# Patient Record
Sex: MALE | Race: WHITE | HISPANIC OR LATINO | Employment: FULL TIME | URBAN - METROPOLITAN AREA
[De-identification: names, ages, dates, MRNs, and addresses within clinical notes are randomized per-mention and may not be internally consistent; named-entity substitution may affect disease eponyms.]

---

## 2018-09-05 ENCOUNTER — OFFICE VISIT (OUTPATIENT)
Dept: UROLOGY | Facility: CLINIC | Age: 38
End: 2018-09-05
Payer: OTHER GOVERNMENT

## 2018-09-05 VITALS
TEMPERATURE: 99 F | HEIGHT: 68 IN | DIASTOLIC BLOOD PRESSURE: 81 MMHG | SYSTOLIC BLOOD PRESSURE: 138 MMHG | BODY MASS INDEX: 32.88 KG/M2 | WEIGHT: 216.94 LBS | HEART RATE: 78 BPM

## 2018-09-05 DIAGNOSIS — N52.9 ERECTILE DYSFUNCTION, UNSPECIFIED ERECTILE DYSFUNCTION TYPE: Primary | ICD-10-CM

## 2018-09-05 PROCEDURE — 99203 OFFICE O/P NEW LOW 30 MIN: CPT | Mod: PBBFAC,PN | Performed by: NURSE PRACTITIONER

## 2018-09-05 PROCEDURE — 99203 OFFICE O/P NEW LOW 30 MIN: CPT | Mod: S$PBB,,, | Performed by: NURSE PRACTITIONER

## 2018-09-05 PROCEDURE — 99999 PR PBB SHADOW E&M-NEW PATIENT-LVL III: CPT | Mod: PBBFAC,,, | Performed by: NURSE PRACTITIONER

## 2018-09-05 RX ORDER — FENOFIBRATE 160 MG/1
145 TABLET ORAL DAILY
COMMUNITY

## 2018-09-05 RX ORDER — TELMISARTAN AND HYDROCHLORTHIAZIDE 80; 25 MG/1; MG/1
1 TABLET ORAL DAILY
COMMUNITY

## 2018-09-05 RX ORDER — OMEPRAZOLE 40 MG/1
40 CAPSULE, DELAYED RELEASE ORAL DAILY
COMMUNITY

## 2018-09-05 NOTE — PATIENT INSTRUCTIONS
Evaluating Erectile Dysfunction     Doctor talking to man.     Many men feel embarrassed to talk to a doctor about erectile dysfunction (ED). This common problem can be treated, but only if your doctor knows about it. Your doctor will likely ask you questions about your ED. Whether youre asked or not, tell your doctor anything that might help your doctor understand the problem. Your doctor may do an exam and may run some tests to help find the cause of your ED.  A simple exam  A medical exam may help your doctor understand what is causing your problem. ED is sometimes the first sign of some other health problem, so your doctor may check your overall health. He or she may also examine your penis, scrotum, and testicles. Tell your doctor about all of the medicines you take, including prescribed and over-the-counter medicines, as well as any herbs or supplements.  You may have some tests  Your doctor may recommend some or all of these tests:  · Blood tests measure your levels of hormones or lipids (fatty substances in the blood, including cholesterol). Other tests check for diabetes or help show the health of your liver, kidneys, and prostate.  · Blood flow tests check how well blood moves through your penis.  · A rectal exam checks for an enlarged prostate gland. An enlarged prostate and ED have been linked in recent studies.  · Additional tests check for other conditions that limit your ability to have intercourse.  Your treatment plan  Based on what you say and what any exam shows, your doctor will recommend a treatment plan. The first step may be to try ED medicines, since they help most men. If they dont help you, your doctor can suggest other kinds of treatment. You and your partner may also want to discuss which options would work best in your relationship. Treatment may include addressing the cause of health problems, such as lowering your cholesterol. And counseling may be recommended to talk about  underlying emotional issues.  Date Last Reviewed: 1/1/2017 © 2000-2017 [employer]. [address] [place] [address], [place] [address]. All rights reserved. This information is not intended as a substitute for professional medical care. Always follow your healthcare professional's instructions.        Oral Medicines for Erectile Dysfunction  You can use prescription oral medicine for ED. They often work well. But, like all medicines, they can have side effects. Also, you cant use them if you have certain health problems or take certain other medicines. Talk with your doctor about oral ED medicine. Ask whether it is right for you.  Types of oral ED medicines  There are three types of prescription oral ED medicines. Each one increases blood flow to the penis. When the penis is stimulated, an erection results. The types are:  · Sildenafil citrate   · Tadalafil   · Vardenafil  · Avanfil  What oral ED medicines dont do  There are some things ED medicines cant do.  · They dont cure the cause of your ED. Without the medicine, youll still have trouble getting an erection.  · They cant produce an erection without sexual stimulation.  · They wont increase sexual desire. They also wont solve any other sexual issues. Psychological, emotional, or relationship issues will not be fixed.  Taking oral ED medicines safely  · Do not combine ED medicines with other treatments unless your doctor tells you to.  · Dont take ED medicines more often or in larger doses than prescribed.  · Tell your doctor your health history. Mention all medicines you take. This includes over-the-counter drugs, supplements, and herbs.  · Ask your doctor about whether you can drink alcohol while taking ED medication.  Possible side effects of oral ED medicines  · Headache  · Facial flushing  · Runny or stuffy nose  · Indigestion  · Distortion of your color vision for a short time  · Sudden vision loss or hearing loss (rare)  · Dizziness  Risks  of oral ED medicines  · Do not take ED medicines if you take medicines containing nitrates. The combination may drop your blood pressure to a dangerous level. Nitrates include nitroglycerin (a drug for angina or chest pain). They are also in poppers, an inhaled recreational drug. If youre not sure whether youre taking nitrates, ask your doctor or pharmacist.  · Medicines called alpha-blockers can interact with ED medicines. They can cause a sudden drop in blood pressure. Alpha-blockers are a common treatment for prostate problems. They also treat high blood pressure. Be sure your doctor knows if you take these medicines.  · If youve had a heart attack or have heart disease and you have not had sex for a while, talk to your doctor. Having sex again can put extra strain on your heart. Your doctor can confirm that your heart is healthy enough for sex.  · It is rare, but some men taking ED medicines have had sudden vision loss. This may be more likely if other health problems are present. These include high blood pressure and diabetes. Ask your doctor if you are at risk for this type of vision loss.  · In rare cases, an erection may last too long. This can badly damage the blood vessels in your penis. If an erection lasts longer than 4 hours, go to the emergency room right away.   Date Last Reviewed: 1/1/2017  © 4275-4852 [employer]. [address] [place] [address], [place] [address]. All rights reserved. This information is not intended as a substitute for professional medical care. Always follow your healthcare professional's instructions.        Understanding Erectile Dysfunction    Erectile dysfunction (ED) is a problem getting an erection firm enough or keeping it long enough for intercourse. The problem can happen to any man at any age. But health problems that can lead to ED become more common as a man ages. Up to half of men over age 40 experience ED at some point.  Causes of ED  ED can have many  causes. Most are physical. Some are emotional issues. Often, a combination of causes is involved. Causes of ED may include:  · Medical conditions such as diabetes or depression  · Smoking tobacco or marijuana  · Drinking too much alcohol  · Side effects of medications  · Injury to nerves or blood vessels  · Emotional issues such as stress or relationship problems  ED can be treated  Prescription medications for ED are available. They help many men who try them. Depending upon the cause of the ED, though, medications may not be enough. In these cases, other treatment options are available. These include erectile aids and surgery. Your health care provider can tell you more about the treatment that is right for you. And new treatments for ED are being studied. No matter what the treatment you decide on, stay in touch with your doctor. If your symptoms persist, he or she may be able to adjust your current treatment or try something new.  Date Last Reviewed: 1/1/2017  © 6071-6006 [employer]. [address] [place] [address], [place] [address]. All rights reserved. This information is not intended as a substitute for professional medical care. Always follow your healthcare professional's instructions.

## 2018-09-05 NOTE — PROGRESS NOTES
[facility] Note  Staff: TONA Pedroza    Referring provider and please cc:   PCP: [facility] [employer]    Chief Complaint: Erectile dysfunction issues    Subjective:        HPI: Tay Petty is a 38 y.o. male NEW PATIENT presents with having problems obtaining adequate erections for 1 year, and he is requesting evaluation related to this.  He states today that on or around November of 2017 his symptoms worsened.    Pt states he is able to achieve orgasm but takes a long time during sexual activity, but states problems with consistent decreasing rigidity, early detumescence, and he also has been experiencing decrease in his libido which also has been gradually worsening over the past few years. He has NOT tried any enhancement products.  Otherwise, he has received no other treatment nor any other evaluation. He, otherwise, denies any other  history. No hematuria. No history of urinary calculi    Gross Hematuria:  None  STDs in past: None  Vasectomy: None    , and admitted to dating someone recently, but now he is not dating at this time.    REVIEW OF SYSTEMS:  Review of Systems   Constitutional: Negative for chills, diaphoresis, fever and weight loss.   HENT: Negative for congestion, hearing loss, nosebleeds and sore throat.    Eyes: Negative for blurred vision and pain.   Respiratory: Negative for cough and wheezing.    Cardiovascular: Negative for chest pain, palpitations and leg swelling.        HTN   Gastrointestinal: Negative for abdominal pain, heartburn, nausea and vomiting.        GERD   Genitourinary: Negative for dysuria, flank pain, frequency, hematuria and urgency.        Erectile dysfunction issues.   Musculoskeletal: Negative for back pain, joint pain, myalgias and neck pain.   Skin: Negative for itching and rash.   Neurological: Negative for dizziness, tremors, sensory change, seizures, loss of consciousness, weakness and headaches.    Endo/Heme/Allergies: Does not bruise/bleed easily.   Psychiatric/Behavioral: Negative for depression and suicidal ideas. The patient is not nervous/anxious.      Physical Exam    PMHx:  Past Medical History:   Diagnosis Date    GERD (gastroesophageal reflux disease)     High cholesterol     Hypertension      Kidney stones: No  Cataracts? None    PSHx:  Past Surgical History:   Procedure Laterality Date    dental implant       Stents/Valves/Foreign Bodies: No  Cardiac Evaluation: No    Screening Studies  Colonoscopy: None     Fam Hx:   malignancies: No    kidney stones: No     Soc Hx:  No tobacco use    Allergies:  Patient has no known allergies.    Medications: reviewed   Anticoagulation: No    Objective:     Vitals:    09/05/18 1423   BP: 138/81   Pulse: 78   Temp: 98.6 °F (37 °C)     General:WDWN in NAD  Eyes: PERRLA, normal conjunctiva  Respiratory: no increased work on breathing, clear to auscultation  Cardiovascular: regular rate and rhythm. No obvious extremity edema.  GI: palpation of masses. No tenderness. No hepatosplenomegaly to palpation.  Musculoskeletal: normal range of motion of bilateral upper extremities. Normal muscle strength and tone.  Skin: no obvious rashes or lesions. No tightening of skin noted.  Neurologic: CN grossly normal. Normal sensation.   Psychiatric: awake, alert and oriented x 3. Mood and affect normal. Cooperative.    : Pt Deferred.    LABS REVIEW:  UA today:  Pt deferred  Assessment:       1. Erectile dysfunction, unspecified erectile dysfunction type          Plan:   ED:    Labs to be done: (*I reviewed with pt today that he had to be FASTING and have labs done prior to 9:30 am anyday in order to obtain accurate results)  CBC, BMP, FSH, LH, Prolactin  PSA, total and free  Free T4  TSH   Testosterone panel    F/u after we review lab results we will contact pt for further evaluation/treatment options.    [facility]: Inactive    Concepcion Guerrero, TONA

## 2018-09-17 ENCOUNTER — LAB VISIT (OUTPATIENT)
Dept: LAB | Facility: HOSPITAL | Age: 38
End: 2018-09-17
Attending: NURSE PRACTITIONER
Payer: OTHER GOVERNMENT

## 2018-09-17 DIAGNOSIS — N52.9 ERECTILE DYSFUNCTION, UNSPECIFIED ERECTILE DYSFUNCTION TYPE: ICD-10-CM

## 2018-09-17 LAB
ANION GAP SERPL CALC-SCNC: 11 MMOL/L
BUN SERPL-MCNC: 13 MG/DL
CALCIUM SERPL-MCNC: 9.8 MG/DL
CHLORIDE SERPL-SCNC: 104 MMOL/L
CO2 SERPL-SCNC: 22 MMOL/L
CREAT SERPL-MCNC: 0.9 MG/DL
ERYTHROCYTE [DISTWIDTH] IN BLOOD BY AUTOMATED COUNT: 12.8 %
EST. GFR  (AFRICAN AMERICAN): >60 ML/MIN/1.73 M^2
EST. GFR  (NON AFRICAN AMERICAN): >60 ML/MIN/1.73 M^2
FSH SERPL-ACNC: 18.7 MIU/ML
GLUCOSE SERPL-MCNC: 107 MG/DL
HCT VFR BLD AUTO: 42.9 %
HGB BLD-MCNC: 14.9 G/DL
LH SERPL-ACNC: 6 MIU/ML
MCH RBC QN AUTO: 31.1 PG
MCHC RBC AUTO-ENTMCNC: 34.8 G/DL
MCV RBC AUTO: 90 FL
PLATELET # BLD AUTO: 214 K/UL
PMV BLD AUTO: 8.5 FL
POTASSIUM SERPL-SCNC: 4.1 MMOL/L
PROLACTIN SERPL IA-MCNC: 12 NG/ML
PROSTATE SPECIFIC ANTIGEN, TOTAL: 0.51 NG/ML
PSA FREE MFR SERPL: 54.9 %
PSA FREE SERPL-MCNC: 0.28 NG/ML
RBC # BLD AUTO: 4.8 M/UL
SODIUM SERPL-SCNC: 137 MMOL/L
T4 FREE SERPL-MCNC: 0.77 NG/DL
TSH SERPL DL<=0.005 MIU/L-ACNC: 1.68 UIU/ML
WBC # BLD AUTO: 4.6 K/UL

## 2018-09-17 PROCEDURE — 84154 ASSAY OF PSA FREE: CPT

## 2018-09-17 PROCEDURE — 80048 BASIC METABOLIC PNL TOTAL CA: CPT

## 2018-09-17 PROCEDURE — 36415 COLL VENOUS BLD VENIPUNCTURE: CPT

## 2018-09-17 PROCEDURE — 85027 COMPLETE CBC AUTOMATED: CPT

## 2018-09-17 PROCEDURE — 84443 ASSAY THYROID STIM HORMONE: CPT

## 2018-09-17 PROCEDURE — 84270 ASSAY OF SEX HORMONE GLOBUL: CPT

## 2018-09-17 PROCEDURE — 84146 ASSAY OF PROLACTIN: CPT

## 2018-09-17 PROCEDURE — 83002 ASSAY OF GONADOTROPIN (LH): CPT

## 2018-09-17 PROCEDURE — 83001 ASSAY OF GONADOTROPIN (FSH): CPT

## 2018-09-17 PROCEDURE — 84439 ASSAY OF FREE THYROXINE: CPT

## 2018-09-20 LAB
ALBUMIN SERPL-MCNC: 4.7 G/DL (ref 3.6–5.1)
SHBG SERPL-SCNC: 35 NMOL/L (ref 10–50)
TESTOST FREE SERPL-MCNC: 74.9 PG/ML (ref 46–224)
TESTOST SERPL-MCNC: 577 NG/DL (ref 250–1100)
TESTOSTERONE.FREE+WB SERPL-MCNC: 160.5 NG/DL (ref 110–575)

## 2018-09-24 ENCOUNTER — TELEPHONE (OUTPATIENT)
Dept: UROLOGY | Facility: CLINIC | Age: 38
End: 2018-09-24

## 2018-09-24 NOTE — TELEPHONE ENCOUNTER
Spoke with patient, MD name given, endocrinlogy, patient will call to make appt, referral put in, patient verbally understood

## 2018-09-24 NOTE — TELEPHONE ENCOUNTER
----- Message from Komal Frey sent at 9/24/2018  9:56 AM CDT -----  Contact: Patient  Type:  Patient Returning Call    Who Called:  Patient  Who Left Message for Patient:    Does the patient know what this is regarding?:  Test results  Best Call Back Number:    Additional Information:

## 2018-09-24 NOTE — TELEPHONE ENCOUNTER
Returned call and spoke with patient, results given with recommendation, ready to give info on endrincologist, patient is driving, will call back to get the info, patient verbally understood.

## 2018-09-24 NOTE — TELEPHONE ENCOUNTER
----- Message from Kimberly Baldwin sent at 9/24/2018  2:24 PM CDT -----  Contact: self  Patient is requesting a call back from Lynne to discuss how far out his appt is with Dr Rodriguez.  Call back at [phone] (home).  Thank you!

## 2018-09-24 NOTE — TELEPHONE ENCOUNTER
Returned call, no answer, message left that I will send the referral to another endocrinologist for sooner appt.

## 2018-10-09 ENCOUNTER — TELEPHONE (OUTPATIENT)
Dept: ENDOCRINOLOGY | Facility: CLINIC | Age: 38
End: 2018-10-09

## 2018-10-09 NOTE — TELEPHONE ENCOUNTER
Advised patient that we did not call him and I do  show his referral still saying pending review.. He should contact office that referred him to have reach out to have them for approval

## 2018-10-09 NOTE — TELEPHONE ENCOUNTER
----- Message from Jamzyn Stover sent at 10/9/2018  3:46 PM CDT -----  Contact: self  Good afternoon,    Pt received a call stating that he had an appt on 10/11 with Dr Cornejo but that he received a  Phone call from his nurse stating that Dr Cornejo doesn't accept .  The patient came into Urology clinic very confused.  Can you please call the patient to explain why the appt needs to be rescheduled.  He can be reached at [phone].  Please leave a message if he doesn't answer his phone.  Thank you

## 2018-10-11 ENCOUNTER — OFFICE VISIT (OUTPATIENT)
Dept: ENDOCRINOLOGY | Facility: CLINIC | Age: 38
End: 2018-10-11
Payer: OTHER GOVERNMENT

## 2018-10-11 VITALS
RESPIRATION RATE: 18 BRPM | BODY MASS INDEX: 32.84 KG/M2 | TEMPERATURE: 98 F | HEIGHT: 68 IN | WEIGHT: 216.69 LBS | SYSTOLIC BLOOD PRESSURE: 131 MMHG | DIASTOLIC BLOOD PRESSURE: 87 MMHG | HEART RATE: 92 BPM

## 2018-10-11 DIAGNOSIS — R79.89 HIGH SERUM FOLLICLE STIMULATING HORMONE (FSH): Primary | ICD-10-CM

## 2018-10-11 PROCEDURE — 99213 OFFICE O/P EST LOW 20 MIN: CPT | Mod: PBBFAC,PO | Performed by: PHYSICIAN ASSISTANT

## 2018-10-11 PROCEDURE — 99203 OFFICE O/P NEW LOW 30 MIN: CPT | Mod: S$PBB,,, | Performed by: PHYSICIAN ASSISTANT

## 2018-10-11 PROCEDURE — 99999 PR PBB SHADOW E&M-EST. PATIENT-LVL III: CPT | Mod: PBBFAC,,, | Performed by: PHYSICIAN ASSISTANT

## 2018-10-11 NOTE — PROGRESS NOTES
"CC: Elevated FSH    HPI: Tay Petty is a 38 y.o. male here for elevated FSH. along with other conditions listed in the Visit Diagnosis. Symptoms started ~9 mths.  No fhx of thyroid disease, testicular cancer or DM.  Referred by urology after being seen for erectile dysfunction. No hx of testicular injury or brain injury. He has not taken any steroid supplements for body building.    New to me and endocrine today.    No decrease in muscle mass. No morning erections. Normal sexual function. Normal hair growth. Last testosterone panel was normal.     PMHx, PSHx: reviewed in epic.    Social Hx: no ETOH/tobacco use.     ROS:   Constitutional: No recent significant weight change  Eyes: No recent visual changes  Cardiovascular: Denies current anginal symptoms  Respiratory: Denies current respiratory difficulty  Gastrointestinal: Denies recent bowel disturbances  GenitoUrinary - No dysuria  Skin: No new skin rash  Neurologic: No focal neurologic complaints  Endo: no polyphagia, polydipsia or polyuria  Remainder ROS negative     /87 (BP Location: Left arm, Patient Position: Sitting, BP Method: Large (Automatic))   Pulse 92   Temp 98.4 °F (36.9 °C) (Oral)   Resp 18   Ht 5' 8" (1.727 m)   Wt 98.3 kg (216 lb 11.4 oz)   BMI 32.95 kg/m²      Personally reviewed labs below:  Lab Results   Component Value Date    TESTOSTERONE 577 09/17/2018    TESTOSTERONE 74.9 09/17/2018      Lab Results   Component Value Date    TSH 1.677 09/17/2018    FREET4 0.77 09/17/2018          Chemistry        Component Value Date/Time     09/17/2018 0718    K 4.1 09/17/2018 0718     09/17/2018 0718    CO2 22 (L) 09/17/2018 0718    BUN 13 09/17/2018 0718    CREATININE 0.9 09/17/2018 0718     09/17/2018 0718        Component Value Date/Time    CALCIUM 9.8 09/17/2018 0718    ESTGFRAFRICA >60 09/17/2018 0718    EGFRNONAA >60 09/17/2018 0718           No results found for: HGBA1C     PE:  GENERAL: middle aged male, " well developed, well nourished  NECK: Supple neck, normal thyroid. No bruit  LYMPHATIC: No cervical or supraclavicular lymphadenopathy  CARDIOVASCULAR: Normal heart sounds, no pedal edema  RESPIRATORY: Normal effort, clear to auscultation bl  ABDOMEN: soft, non-tender, non-distended.  FEET: appropriate footwear.   PSYCH: normal mood and affect    Assessment/Plan:   1. High serum follicle stimulating hormone (FSH)  US Scrotum And Testicles     High serum FSH-scrotal u/s to r/o seminiferous tuble defects      F/u pending labs

## 2018-10-17 ENCOUNTER — HOSPITAL ENCOUNTER (OUTPATIENT)
Dept: RADIOLOGY | Facility: CLINIC | Age: 38
Discharge: HOME | End: 2018-10-17
Attending: PHYSICIAN ASSISTANT
Payer: OTHER GOVERNMENT

## 2018-10-17 DIAGNOSIS — R79.89 HIGH SERUM FOLLICLE STIMULATING HORMONE (FSH): ICD-10-CM

## 2018-10-17 PROCEDURE — 76870 US EXAM SCROTUM: CPT | Mod: 26,,, | Performed by: RADIOLOGY

## 2018-10-17 PROCEDURE — 76870 US EXAM SCROTUM: CPT | Mod: TC,PO

## 2018-10-29 ENCOUNTER — OFFICE VISIT (OUTPATIENT)
Dept: UROLOGY | Facility: CLINIC | Age: 38
End: 2018-10-29
Payer: OTHER GOVERNMENT

## 2018-10-29 VITALS
TEMPERATURE: 98 F | HEIGHT: 68 IN | SYSTOLIC BLOOD PRESSURE: 125 MMHG | BODY MASS INDEX: 32.84 KG/M2 | RESPIRATION RATE: 18 BRPM | HEART RATE: 77 BPM | DIASTOLIC BLOOD PRESSURE: 83 MMHG | WEIGHT: 216.69 LBS

## 2018-10-29 DIAGNOSIS — R79.89 ABNORMAL PITUITARY FOLLICLE STIMULATING HORMONE (FSH): ICD-10-CM

## 2018-10-29 DIAGNOSIS — N52.9 ERECTILE DYSFUNCTION, UNSPECIFIED ERECTILE DYSFUNCTION TYPE: Primary | ICD-10-CM

## 2018-10-29 PROCEDURE — 99999 PR PBB SHADOW E&M-EST. PATIENT-LVL IV: CPT | Mod: PBBFAC,,, | Performed by: NURSE PRACTITIONER

## 2018-10-29 PROCEDURE — 99213 OFFICE O/P EST LOW 20 MIN: CPT | Mod: S$PBB,,, | Performed by: NURSE PRACTITIONER

## 2018-10-29 PROCEDURE — 99214 OFFICE O/P EST MOD 30 MIN: CPT | Mod: PBBFAC,PN | Performed by: NURSE PRACTITIONER

## 2018-10-29 RX ORDER — SILDENAFIL 100 MG/1
TABLET, FILM COATED ORAL
Qty: 6 TABLET | Refills: 12 | Status: SHIPPED | OUTPATIENT
Start: 2018-10-29

## 2018-10-29 NOTE — PROGRESS NOTES
[facility] Note  Staff: TONA Pedroza    PCP: None    Chief Complaint: Follow-up: ED, abnormal FSH lab    Subjective:        HPI: Tay Petty is a 38 y.o. male presents today for follow-up.  I initially saw this patient on 09/05/2018 with c/o erectile dysfunction issues.  Pt had labs done after first visit with us and it was found he had ABNORMAL FSH level.  Therefore on 10/12/2018 pt saw Endocrinology office--TORRIE Barnes for further evaluation.  Thus a Scrotal/Testicle US was ordered by endocrine to rule out seminiferous tuble defects.    Pt currently denies dysuria, hematuria or urination issues.    US of Scrotum/Testicles done on 10/17/18:  IMPRESSION:  6 mm left epididymal cyst otherwise negative scrotal ultrasound.    Labs performed on 09/17/18:  FSH:  **18.70  (Elevated)  LH:  6.0  Prolactin:  12.0  CBC overall WNL  BMP:  WNL  T4, Free:  0.77  TSH:  1.677    PSA, total and free:  0.51; 0.28; 54.90%    Testosterone level:  577    PT'S  HISTORY:  presents with having problems obtaining adequate erections for 1 year, and he is requesting evaluation related to this.  He states today that on or around November of 2017 his symptoms worsened.     Pt states he is able to achieve orgasm but takes a long time during sexual activity, but states problems with consistent decreasing rigidity, early detumescence, and he also has been experiencing decrease in his libido which also has been gradually worsening over the past few years. He has NOT tried any enhancement products.  Otherwise, he has received no other treatment nor any other evaluation. He, otherwise, denies any other  history. No hematuria. No history of urinary calculi     Gross Hematuria:  None  STDs in past: None  Vasectomy: None     , and admitted to dating someone recently, but now he is not dating at this time.    REVIEW OF SYSTEMS:  Review of Systems   Constitutional: Negative for chills,  diaphoresis, fever and weight loss.   HENT: Negative for congestion, hearing loss, nosebleeds and sore throat.    Eyes: Negative for blurred vision and pain.   Respiratory: Negative for cough and wheezing.    Cardiovascular: Negative for chest pain, palpitations and leg swelling.        HTN   Gastrointestinal: Negative for abdominal pain, heartburn, nausea and vomiting.        GERD   Genitourinary: Negative for dysuria, flank pain, frequency, hematuria and urgency.        Erectile Dysfunction++   Musculoskeletal: Negative for back pain, joint pain, myalgias and neck pain.   Skin: Negative for itching and rash.   Neurological: Negative for dizziness, tremors, sensory change, seizures, loss of consciousness, weakness and headaches.   Endo/Heme/Allergies: Does not bruise/bleed easily.   Psychiatric/Behavioral: Negative for depression and suicidal ideas. The patient is not nervous/anxious.      Physical Exam    PMHx:  Past Medical History:   Diagnosis Date    GERD (gastroesophageal reflux disease)     High cholesterol     Hypertension      PSHx:  Past Surgical History:   Procedure Laterality Date    dental implant       Allergies:  Patient has no known allergies.    Medications: reviewed   Anticoagulation: No    Objective:     Vitals:    10/29/18 1523   BP: 125/83   Pulse: 77   Resp: 18   Temp: 98.2 °F (36.8 °C)     General:WDWN in NAD  Eyes: PERRLA, normal conjunctiva  Respiratory: no increased work on breathing, clear to auscultation  Cardiovascular: regular rate and rhythm. No obvious extremity edema.  GI: palpation of masses. No tenderness. No hepatosplenomegaly to palpation.  Musculoskeletal: normal range of motion of bilateral upper extremities. Normal muscle strength and tone.  Skin: no obvious rashes or lesions. No tightening of skin noted.  Neurologic: CN grossly normal. Normal sensation.   Psychiatric: awake, alert and oriented x 3. Mood and affect normal. Cooperative.    Assessment:       1. Erectile  dysfunction, unspecified erectile dysfunction type    2. Abnormal pituitary follicle stimulating hormone (FSH)          Plan:   ED:    Viagra 100 mg prn ED prescribed to pt in office today.    F/u--Pt will contact me via telephone/email to let me know how medication is working for him, then we will discuss further evaluation/treatment options.    Pt has a f/up appt with Michael, Endocrinologist in Feb. 2019 and I instructed pt to keep this appointment.    [facility]: N/A    Concepcion Guerrero, ELSIE-C

## 2018-10-29 NOTE — PATIENT INSTRUCTIONS
Sildenafil tablets (Viagra)  What is this medicine?  SILDENAFIL (barby DEN a ronal) is used to treat erection problems in men.  How should I use this medicine?  Take this medicine by mouth with a glass of water. Follow the directions on the prescription label. The dose is usually taken 1 hour before sexual activity. You should not take the dose more than once per day. Do not take your medicine more often than directed.  Talk to your pediatrician regarding the use of this medicine in children. This medicine is not used in children for this condition.  What side effects may I notice from receiving this medicine?  Side effects that you should report to your doctor or health care professional as soon as possible:  · allergic reactions like skin rash, itching or hives, swelling of the face, lips, or tongue  · breathing problems  · changes in hearing  · changes in vision  · chest pain  · fast, irregular heartbeat  · prolonged or painful erection  · seizures  Side effects that usually do not require medical attention (report to your doctor or health care professional if they continue or are bothersome):  · back pain  · dizziness  · flushing  · headache  · indigestion  · muscle aches  · nausea  · stuffy or runny nose  What may interact with this medicine?  Do not take this medicine with any of the following medications:  · cisapride  · methscopolamine nitrate  · nitrates like amyl nitrite, isosorbide dinitrate, isosorbide mononitrate, nitroglycerin  · nitroprusside  · other medicines for erectile dysfunction like avanafil, tadalafil, vardenafil  · riociguat  · other sildenafil products (Revatio)  This medicine may also interact with the following medications:  · certain drugs for high blood pressure  · certain drugs for the treatment of HIV infection or AIDS  · certain drugs used for fungal or yeast infections, like fluconazole, itraconazole, ketoconazole, and voriconazole  · cimetidine  · erythromycin  · rifampin  What if I  miss a dose?  This does not apply. Do not take double or extra doses.  Where should I keep my medicine?  Keep out of reach of children.  Store at room temperature between 15 and 30 degrees C (59 and 86 degrees F). Throw away any unused medicine after the expiration date.  What should I tell my health care provider before I take this medicine?  They need to know if you have any of these conditions:  · bleeding disorders  · eye or vision problems, including a rare inherited eye disease called retinitis pigmentosa  · anatomical deformation of the penis, Peyronie's disease, or history of priapism (painful and prolonged erection)  · heart disease, angina, a history of heart attack, irregular heart beats, or other heart problems  · high or low blood pressure  · history of blood diseases, like sickle cell anemia or leukemia  · history of stomach bleeding  · kidney disease  · liver disease  · stroke  · an unusual or allergic reaction to sildenafil, other medicines, foods, dyes, or preservatives  · pregnant or trying to get pregnant  · breast-feeding  What should I watch for while using this medicine?  If you notice any changes in your vision while taking this drug, call your doctor or health care professional as soon as possible. Stop using this medicine and call your health care provider right away if you have a loss of sight in one or both eyes.  Contact your doctor or health care professional right away if you have an erection that lasts longer than 4 hours or if it becomes painful. This may be a sign of a serious problem and must be treated right away to prevent permanent damage.  If you experience symptoms of nausea, dizziness, chest pain or arm pain upon initiation of sexual activity after taking this medicine, you should refrain from further activity and call your doctor or health care professional as soon as possible.  Do not drink alcohol to excess (examples, 5 glasses of wine or 5 shots of whiskey) when taking  this medicine. When taken in excess, alcohol can increase your chances of getting a headache or getting dizzy, increasing your heart rate or lowering your blood pressure.  Using this medicine does not protect you or your partner against HIV infection (the virus that causes AIDS) or other sexually transmitted diseases.  NOTE:This sheet is a summary. It may not cover all possible information. If you have questions about this medicine, talk to your doctor, pharmacist, or health care provider. Copyright© 2017 [employer]

## 2019-02-11 ENCOUNTER — LAB VISIT (OUTPATIENT)
Dept: LAB | Facility: HOSPITAL | Age: 39
End: 2019-02-11
Attending: INTERNAL MEDICINE
Payer: OTHER GOVERNMENT

## 2019-02-11 ENCOUNTER — OFFICE VISIT (OUTPATIENT)
Dept: ENDOCRINOLOGY | Facility: CLINIC | Age: 39
End: 2019-02-11
Payer: OTHER GOVERNMENT

## 2019-02-11 VITALS
DIASTOLIC BLOOD PRESSURE: 82 MMHG | BODY MASS INDEX: 34.96 KG/M2 | RESPIRATION RATE: 18 BRPM | HEART RATE: 100 BPM | SYSTOLIC BLOOD PRESSURE: 135 MMHG | HEIGHT: 68 IN | TEMPERATURE: 98 F | WEIGHT: 230.69 LBS

## 2019-02-11 DIAGNOSIS — K21.9 GASTROESOPHAGEAL REFLUX DISEASE WITHOUT ESOPHAGITIS: ICD-10-CM

## 2019-02-11 DIAGNOSIS — E66.9 OBESITY (BMI 30-39.9): ICD-10-CM

## 2019-02-11 DIAGNOSIS — E78.5 HYPERLIPIDEMIA, UNSPECIFIED HYPERLIPIDEMIA TYPE: ICD-10-CM

## 2019-02-11 DIAGNOSIS — I10 ESSENTIAL HYPERTENSION: ICD-10-CM

## 2019-02-11 DIAGNOSIS — R73.9 HYPERGLYCEMIA: ICD-10-CM

## 2019-02-11 DIAGNOSIS — E78.1 HYPERTRIGLYCERIDEMIA: ICD-10-CM

## 2019-02-11 DIAGNOSIS — N52.9 ERECTILE DYSFUNCTION, UNSPECIFIED ERECTILE DYSFUNCTION TYPE: ICD-10-CM

## 2019-02-11 DIAGNOSIS — E55.9 HYPOVITAMINOSIS D: ICD-10-CM

## 2019-02-11 DIAGNOSIS — R68.89 ABNORMAL ENDOCRINE LABORATORY TEST FINDING: ICD-10-CM

## 2019-02-11 DIAGNOSIS — E88.810 DYSMETABOLIC SYNDROME: ICD-10-CM

## 2019-02-11 DIAGNOSIS — R68.89 ABNORMAL ENDOCRINE LABORATORY TEST FINDING: Primary | ICD-10-CM

## 2019-02-11 LAB
ALBUMIN SERPL BCP-MCNC: 4.3 G/DL
ALP SERPL-CCNC: 67 U/L
ALT SERPL W/O P-5'-P-CCNC: 36 U/L
ANION GAP SERPL CALC-SCNC: 10 MMOL/L
AST SERPL-CCNC: 51 U/L
BASOPHILS # BLD AUTO: 0.04 K/UL
BASOPHILS NFR BLD: 0.8 %
BILIRUB SERPL-MCNC: 0.7 MG/DL
BUN SERPL-MCNC: 13 MG/DL
CA-I BLDV-SCNC: 1.28 MMOL/L
CALCIUM SERPL-MCNC: 9.8 MG/DL
CHLORIDE SERPL-SCNC: 103 MMOL/L
CHOLEST SERPL-MCNC: 195 MG/DL
CHOLEST/HDLC SERPL: 4.8 {RATIO}
CO2 SERPL-SCNC: 24 MMOL/L
CREAT SERPL-MCNC: 1 MG/DL
DIFFERENTIAL METHOD: ABNORMAL
EOSINOPHIL # BLD AUTO: 0.1 K/UL
EOSINOPHIL NFR BLD: 2 %
ERYTHROCYTE [DISTWIDTH] IN BLOOD BY AUTOMATED COUNT: 12.3 %
EST. GFR  (AFRICAN AMERICAN): >60 ML/MIN/1.73 M^2
EST. GFR  (NON AFRICAN AMERICAN): >60 ML/MIN/1.73 M^2
ESTRADIOL SERPL-MCNC: 21 PG/ML
FSH SERPL-ACNC: 16.4 MIU/ML
GLUCOSE SERPL-MCNC: 142 MG/DL
HCT VFR BLD AUTO: 42.1 %
HDLC SERPL-MCNC: 41 MG/DL
HDLC SERPL: 21 %
HGB BLD-MCNC: 14.9 G/DL
IMM GRANULOCYTES # BLD AUTO: 0.01 K/UL
IMM GRANULOCYTES NFR BLD AUTO: 0.2 %
INSULIN COLLECTION INTERVAL: ABNORMAL
INSULIN SERPL-ACNC: 109.6 UU/ML
LDLC SERPL CALC-MCNC: ABNORMAL MG/DL
LH SERPL-ACNC: 5.8 MIU/ML
LYMPHOCYTES # BLD AUTO: 2 K/UL
LYMPHOCYTES NFR BLD: 39.9 %
MCH RBC QN AUTO: 31.2 PG
MCHC RBC AUTO-ENTMCNC: 35.4 G/DL
MCV RBC AUTO: 88 FL
MONOCYTES # BLD AUTO: 0.2 K/UL
MONOCYTES NFR BLD: 4.1 %
NEUTROPHILS # BLD AUTO: 2.6 K/UL
NEUTROPHILS NFR BLD: 53 %
NONHDLC SERPL-MCNC: 154 MG/DL
NRBC BLD-RTO: 0 /100 WBC
PLATELET # BLD AUTO: 219 K/UL
PMV BLD AUTO: 11.3 FL
POTASSIUM SERPL-SCNC: 3.5 MMOL/L
PROT SERPL-MCNC: 7.9 G/DL
PTH-INTACT SERPL-MCNC: 26 PG/ML
RBC # BLD AUTO: 4.78 M/UL
SODIUM SERPL-SCNC: 137 MMOL/L
T3 SERPL-MCNC: 94 NG/DL
T4 FREE SERPL-MCNC: 0.8 NG/DL
TRIGL SERPL-MCNC: 425 MG/DL
TSH SERPL DL<=0.005 MIU/L-ACNC: 1.33 UIU/ML
URATE SERPL-MCNC: 5 MG/DL
WBC # BLD AUTO: 4.89 K/UL

## 2019-02-11 PROCEDURE — 84550 ASSAY OF BLOOD/URIC ACID: CPT

## 2019-02-11 PROCEDURE — 83525 ASSAY OF INSULIN: CPT

## 2019-02-11 PROCEDURE — 83036 HEMOGLOBIN GLYCOSYLATED A1C: CPT

## 2019-02-11 PROCEDURE — 99999 PR PBB SHADOW E&M-EST. PATIENT-LVL III: ICD-10-PCS | Mod: PBBFAC,,, | Performed by: INTERNAL MEDICINE

## 2019-02-11 PROCEDURE — 80053 COMPREHEN METABOLIC PANEL: CPT

## 2019-02-11 PROCEDURE — 82672 ASSAY OF ESTROGEN: CPT

## 2019-02-11 PROCEDURE — 82642 DIHYDROTESTOSTERONE: CPT

## 2019-02-11 PROCEDURE — 85025 COMPLETE CBC W/AUTO DIFF WBC: CPT

## 2019-02-11 PROCEDURE — 99999 PR PBB SHADOW E&M-EST. PATIENT-LVL III: CPT | Mod: PBBFAC,,, | Performed by: INTERNAL MEDICINE

## 2019-02-11 PROCEDURE — 82040 ASSAY OF SERUM ALBUMIN: CPT

## 2019-02-11 PROCEDURE — 80061 LIPID PANEL: CPT

## 2019-02-11 PROCEDURE — 86316 IMMUNOASSAY TUMOR OTHER: CPT

## 2019-02-11 PROCEDURE — 99214 PR OFFICE/OUTPT VISIT, EST, LEVL IV, 30-39 MIN: ICD-10-PCS | Mod: S$PBB,,, | Performed by: INTERNAL MEDICINE

## 2019-02-11 PROCEDURE — 84244 ASSAY OF RENIN: CPT

## 2019-02-11 PROCEDURE — 82088 ASSAY OF ALDOSTERONE: CPT

## 2019-02-11 PROCEDURE — 83520 IMMUNOASSAY QUANT NOS NONAB: CPT

## 2019-02-11 PROCEDURE — 82533 TOTAL CORTISOL: CPT

## 2019-02-11 PROCEDURE — 82330 ASSAY OF CALCIUM: CPT

## 2019-02-11 PROCEDURE — 84480 ASSAY TRIIODOTHYRONINE (T3): CPT

## 2019-02-11 PROCEDURE — 82626 DEHYDROEPIANDROSTERONE: CPT

## 2019-02-11 PROCEDURE — 83002 ASSAY OF GONADOTROPIN (LH): CPT

## 2019-02-11 PROCEDURE — 83001 ASSAY OF GONADOTROPIN (FSH): CPT

## 2019-02-11 PROCEDURE — 83970 ASSAY OF PARATHORMONE: CPT

## 2019-02-11 PROCEDURE — 84443 ASSAY THYROID STIM HORMONE: CPT

## 2019-02-11 PROCEDURE — 82024 ASSAY OF ACTH: CPT

## 2019-02-11 PROCEDURE — 82306 VITAMIN D 25 HYDROXY: CPT

## 2019-02-11 PROCEDURE — 82670 ASSAY OF TOTAL ESTRADIOL: CPT

## 2019-02-11 PROCEDURE — 99213 OFFICE O/P EST LOW 20 MIN: CPT | Mod: PBBFAC,PO | Performed by: INTERNAL MEDICINE

## 2019-02-11 PROCEDURE — 82627 DEHYDROEPIANDROSTERONE: CPT

## 2019-02-11 PROCEDURE — 99214 OFFICE O/P EST MOD 30 MIN: CPT | Mod: S$PBB,,, | Performed by: INTERNAL MEDICINE

## 2019-02-11 PROCEDURE — 84439 ASSAY OF FREE THYROXINE: CPT

## 2019-02-11 NOTE — PROGRESS NOTES
Subjective:      Patient ID: Tay Petty is a 38 y.o. male.    Chief Complaint:  abnormal labs    Patient is a 38 yr old gentleman seen for initial care visit today on account of abnormal endocrine lab tests.  History of Present Illness    Patient is a 38 yr old gentleman with background history of essential hypertensin, hyper TG, dyslipidemia, obesity and dysmetabolic syndrome seen for initial care visit today on account of abnormal endocrine lab test results;  His associated background comorbidities are as follows;  Patient Active Problem List   Diagnosis    Essential hypertension    Dysmetabolic syndrome    Obesity (BMI 30-39.9)    Hypertriglyceridemia    Hyperlipidemia    Gastroesophageal reflux disease without esophagitis    Erectile dysfunction    Abnormal endocrine laboratory test finding     Patients PCP is Dr Singh @ [facility] [place].  Patients baseline Oxford score is 2.   Patients baseline scrotal ultrasound showed nil except left small cyst (epididymal).  Patients ED started ~ 02/18. Patient has been under some job related pressure.  He has had no significant head injuries.  He has also not had any groin injuries nor surgery.     There is also no family history of ED not hypogonadism. Patients libido is fine.  He has no persistent headaches, he has no changes in his sense of smell, no change in sense of taste and he has not noted any nipple discharge nor breast tissue enlargement.  Patient does not smoke.  Patients has ~ 12 beers a month; mainly on fridays.  Patient works in the [employer] as a .        Review of Systems   Constitutional: Negative for activity change, appetite change, diaphoresis, fatigue, fever and unexpected weight change.   HENT: Negative for hearing loss, mouth sores, trouble swallowing and voice change.    Eyes: Negative for photophobia and visual disturbance.   Respiratory: Negative for apnea, cough, chest tightness, shortness of breath, wheezing  "and stridor.    Cardiovascular: Negative for chest pain, palpitations and leg swelling.   Gastrointestinal: Negative for abdominal distention, abdominal pain, constipation, diarrhea and nausea.   Endocrine: Negative for cold intolerance, heat intolerance, polydipsia, polyphagia and polyuria.   Genitourinary: Negative for dysuria, flank pain, frequency, hematuria and urgency. Penile pain: has reduced sustainability of erections but libido remains essentially normal.   Musculoskeletal: Negative for arthralgias, back pain, gait problem, joint swelling, myalgias and neck pain.   Skin: Negative for color change, pallor and rash.   Neurological: Negative for dizziness, tremors, seizures, syncope, light-headedness, numbness and headaches.   Hematological: Does not bruise/bleed easily.   Psychiatric/Behavioral: Negative for agitation, confusion, dysphoric mood and sleep disturbance. The patient is not nervous/anxious and is not hyperactive.        Objective: /82 (BP Location: Right arm, Patient Position: Sitting, BP Method: Large (Automatic))   Pulse 100   Temp 98.3 °F (36.8 °C) (Oral)   Resp 18   Ht 5' 8" (1.727 m)   Wt 104.6 kg (230 lb 11.4 oz)   BMI 35.08 kg/m²  Body surface area is 2.24 meters squared.         Physical Exam   Constitutional: He is oriented to person, place, and time. Vital signs are normal. He appears well-developed and well-nourished.  Non-toxic appearance. No distress.   Pleasant middle aged gentleman. Not pale, anicteric and afebrile. Well hydrated. Not in any acute distress. Clinically comfortable. Has some degree of androgenic alopecia.   HENT:   Head: Normocephalic and atraumatic. Head is without right periorbital erythema and without left periorbital erythema. Hair is normal.       Mouth/Throat: No oropharyngeal exudate.   Nuchal AN with mallampati grade 3 fauces.   Eyes: Conjunctivae, EOM and lids are normal. Pupils are equal, round, and reactive to light. No scleral icterus.   Neck: " Trachea normal and normal range of motion. Neck supple. No JVD present. No tracheal tenderness present. Carotid bruit is not present. No tracheal deviation present. No thyroid mass and no thyromegaly present.   Cardiovascular: Normal rate, regular rhythm, normal heart sounds and normal pulses. Exam reveals no gallop.   No murmur heard.  Pulmonary/Chest: Effort normal and breath sounds normal. No respiratory distress. He has no decreased breath sounds. He has no wheezes. He has no rales.   No gynecomastia   Abdominal: Soft. Bowel sounds are normal. He exhibits no distension and no mass. There is no hepatosplenomegaly. There is no tenderness.   Musculoskeletal: Normal range of motion. He exhibits no edema or tenderness.   No pedal edema nor calf swelling.   Lymphadenopathy:     He has no cervical adenopathy.   Neurological: He is alert and oriented to person, place, and time. He has normal strength and normal reflexes. He displays no tremor and normal reflexes. No cranial nerve deficit. He exhibits normal muscle tone. Gait normal.   Skin: Skin is warm, dry and intact. No bruising, no ecchymosis, no petechiae and no rash noted. He is not diaphoretic. No cyanosis or erythema. Nails show no clubbing.   Psychiatric: He has a normal mood and affect. His speech is normal and behavior is normal. Judgment and thought content normal. His mood appears not anxious. Cognition and memory are normal. He does not exhibit a depressed mood.   Vitals reviewed.      Lab Review:     Results for TRISAH DEL RIO (MRN [id number]) as of 2/11/2019 09:24   Ref. Range 9/17/2018 07:18 10/17/2018 14:50   WBC Latest Ref Range: 3.90 - 12.70 K/uL 4.60    RBC Latest Ref Range: 4.60 - 6.20 M/uL 4.80    Hemoglobin Latest Ref Range: 14.0 - 18.0 g/dL 14.9    Hematocrit Latest Ref Range: 40.0 - 54.0 % 42.9    MCV Latest Ref Range: 82 - 98 fL 90    MCH Latest Ref Range: 27.0 - 31.0 pg 31.1 (H)    MCHC Latest Ref Range: 32.0 - 36.0 g/dL 34.8    RDW  Latest Ref Range: 11.5 - 14.5 % 12.8    Platelets Latest Ref Range: 150 - 350 K/uL 214    MPV Latest Ref Range: 9.2 - 12.9 fL 8.5 (L)    Sodium Latest Ref Range: 136 - 145 mmol/L 137    Potassium Latest Ref Range: 3.5 - 5.1 mmol/L 4.1    Chloride Latest Ref Range: 95 - 110 mmol/L 104    CO2 Latest Ref Range: 23 - 29 mmol/L 22 (L)    Anion Gap Latest Ref Range: 8 - 16 mmol/L 11    BUN, Bld Latest Ref Range: 6 - 20 mg/dL 13    Creatinine Latest Ref Range: 0.5 - 1.4 mg/dL 0.9    eGFR if non African American Latest Ref Range: >60 mL/min/1.73 m^2 >60    eGFR if  Latest Ref Range: >60 mL/min/1.73 m^2 >60    Glucose Latest Ref Range: 70 - 110 mg/dL 107    Calcium Latest Ref Range: 8.7 - 10.5 mg/dL 9.8    TSH Latest Ref Range: 0.400 - 4.000 uIU/mL 1.677    Free T4 Latest Ref Range: 0.71 - 1.51 ng/dL 0.77    PSA Total Latest Ref Range: 0.00 - 4.00 ng/mL 0.51    PSA, Free Latest Ref Range: 0.01 - 1.50 ng/mL 0.28    PSA, Free Pct Latest Ref Range: Not established % 54.90    Albumin Latest Ref Range: 3.6 - 5.1 g/dL 4.7    FSH Latest Ref Range: 0.95 - 11.95 mIU/mL 18.70 (H)    LH Latest Ref Range: 0.6 - 12.1 mIU/mL 6.0    Prolactin Latest Ref Range: 3.5 - 19.4 ng/mL 12.0    Sex Hormone Binding Globulin Latest Ref Range: 10 - 50 nmol/L 35    Testosterone Latest Ref Range: 250 - 1100 ng/dL 577    Testosterone Testosterone Latest Ref Range: 110.0 - 575.0 ng/dL 160.5    Testosterone, Free Latest Ref Range: 46.0 - 224.0 pg/mL 74.9        Assessment:     1. Abnormal endocrine laboratory test finding  CBC auto differential    Luteinizing hormone    Estradiol    Estrogens, total    Follicle stimulating hormone    Alpha Sub Unit    Chromogranin A   2. Essential hypertension  T4, free    T3    TSH    Urinalysis    Microalbumin/creatinine urine ratio    Aldosterone    ACTH    Renin   3. Dysmetabolic syndrome  Hemoglobin A1c    Uric acid    Lipid panel    Insulin, random    Comprehensive metabolic panel    CBC auto  differential    T4, free    T3    TSH    Testosterone Panel    Dihydrotestosterone    DHEA-sulfate    DHEA    Cortisol    Aldosterone    ACTH    Renin   4. Obesity (BMI 30-39.9)     5. Hypertriglyceridemia  Lipid panel    Comprehensive metabolic panel    T4, free    T3    TSH   6. Hyperlipidemia, unspecified hyperlipidemia type  Lipid panel    Comprehensive metabolic panel    T4, free    T3    TSH   7. Gastroesophageal reflux disease without esophagitis     8. Erectile dysfunction, unspecified erectile dysfunction type     9. Hyperglycemia     10. Hypovitaminosis D  PTH, intact    Calcium, ionized    Vitamin D      Regarding isolated increased FSH. This may be the result of timing relative to a normal FSH surge.  Will recheck levels today. Will also obtain other screening labs as detailed above. If these are unremarkable, he wont need a follow up visit with us. If the value is elevated again we will then need to obtain a screening pituitary MRI.  Regarding dysmetabolic syndrome; to obtain screening labs as detailed above to evaluate current glycemic status. Patient has no known family history of diabetes.  Regarding obesity; encourage portion size control and reduction in total caloric intake. May benefit from formal dietary counselling.  Regarding hyper TG; to continue fenofibrate as before.   Regarding hypertension; presently well controlled. To continue present antihypertensive regimen and serial ambulatory tracking of BP trends.    Plan:     FFup in ~ 6mths.

## 2019-02-11 NOTE — LETTER
February 11, 2019      Concepcion Guerrero, [address]  [address]  [place] [address]           [place] [facility]  [address]  [place] [address]  Phone: [phone]          Patient: Tay Petty   MR Number: [id number]   YOB: 1980   Date of Visit: 2/11/2019       Dear Concepcion Guerrero:    Thank you for referring Tay Petty to me for evaluation. Attached you will find relevant portions of my assessment and plan of care.    If you have questions, please do not hesitate to call me. I look forward to following Tay Petty along with you.    Sincerely,    Ross Rodriguez MD    Enclosure  CC:  No Recipients    If you would like to receive this communication electronically, please contact [email or web address] or [phone] to request more information on "[employer] Link access.    For providers and/or their staff who would like to refer a patient to [facility], please contact us through our one-stop-shop provider referral line, [facility], at [phone].    If you feel you have received this communication in error or would no longer like to receive these types of communications, please e-mail [email or web address]

## 2019-02-12 DIAGNOSIS — E88.819 INSULIN RESISTANCE: ICD-10-CM

## 2019-02-12 DIAGNOSIS — R68.89 ABNORMAL ENDOCRINE LABORATORY TEST FINDING: ICD-10-CM

## 2019-02-12 DIAGNOSIS — E16.1 HYPERINSULINEMIA: ICD-10-CM

## 2019-02-12 DIAGNOSIS — E55.9 HYPOVITAMINOSIS D: Primary | ICD-10-CM

## 2019-02-12 LAB
25(OH)D3+25(OH)D2 SERPL-MCNC: 14 NG/ML
ACTH PLAS-MCNC: 24 PG/ML
CORTIS SERPL-MCNC: 8 UG/DL
DHEA-S SERPL-MCNC: 232.1 UG/DL
ESTIMATED AVG GLUCOSE: 100 MG/DL
HBA1C MFR BLD HPLC: 5.1 %

## 2019-02-12 RX ORDER — ERGOCALCIFEROL 1.25 MG/1
50000 CAPSULE ORAL
Qty: 12 CAPSULE | Refills: 3 | Status: SHIPPED | OUTPATIENT
Start: 2019-02-12 | End: 2019-05-13

## 2019-02-13 ENCOUNTER — TELEPHONE (OUTPATIENT)
Dept: ENDOCRINOLOGY | Facility: CLINIC | Age: 39
End: 2019-02-13

## 2019-02-13 NOTE — TELEPHONE ENCOUNTER
----- Message from Keyonna Alcala sent at 2/13/2019 11:20 AM CST -----  Contact: pt  Type: Needs Medical Advice    Who Called:  Patient  Best Call Back Number: [phone] (home)   Additional Information: patient is wanting to schedule an MRI would like a call back please

## 2019-02-14 LAB
ALDOST SERPL-MCNC: 9.4 NG/DL
ANDROSTANOLONE SERPL-MCNC: 270 PG/ML (ref 112–955)
ESTROGEN SERPL-MCNC: 151 PG/ML
RENIN PLAS-CCNC: 84 NG/ML/H

## 2019-02-14 NOTE — TELEPHONE ENCOUNTER
Patient came into clinic and was requesting the reason Dr. Rodriguez ordered the MRI so he could give to his PCP so his PCP can write a referral for the visit the patient was seen by . Gave patient the referral for the MRI which had the reason on it.

## 2019-02-15 LAB — CGA SERPL-MCNC: 54 NG/ML (ref 0–95)

## 2019-02-16 LAB
ALBUMIN SERPL-MCNC: 4.6 G/DL (ref 3.6–5.1)
DHEA SERPL-MCNC: 1.97 NG/ML (ref 0.63–4.7)
SHBG SERPL-SCNC: 30 NMOL/L (ref 10–50)
TESTOST FREE SERPL-MCNC: 55.8 PG/ML (ref 46–224)
TESTOST SERPL-MCNC: 398 NG/DL (ref 250–1100)
TESTOSTERONE.FREE+WB SERPL-MCNC: 117.2 NG/DL (ref 110–575)

## 2019-02-20 LAB — A-PGH SER-MCNC: 0.2 NG/ML

## 2019-02-26 ENCOUNTER — HOSPITAL ENCOUNTER (OUTPATIENT)
Dept: RADIOLOGY | Facility: HOSPITAL | Age: 39
Discharge: HOME | End: 2019-02-26
Attending: INTERNAL MEDICINE
Payer: OTHER GOVERNMENT

## 2019-02-26 DIAGNOSIS — R68.89 ABNORMAL ENDOCRINE LABORATORY TEST FINDING: ICD-10-CM

## 2019-02-26 PROCEDURE — 70553 MRI BRAIN STEM W/O & W/DYE: CPT | Mod: TC

## 2019-02-26 PROCEDURE — 70553 MRI BRAIN STEM W/O & W/DYE: CPT | Mod: 26,,, | Performed by: RADIOLOGY

## 2019-02-26 PROCEDURE — 25500020 PHARM REV CODE 255: Performed by: INTERNAL MEDICINE

## 2019-02-26 PROCEDURE — A9585 GADOBUTROL INJECTION: HCPCS | Performed by: INTERNAL MEDICINE

## 2019-02-26 PROCEDURE — 70553 MRI BRAIN W WO CONTRAST: ICD-10-PCS | Mod: 26,,, | Performed by: RADIOLOGY

## 2019-02-26 RX ORDER — GADOBUTROL 604.72 MG/ML
5 INJECTION INTRAVENOUS
Status: COMPLETED | OUTPATIENT
Start: 2019-02-26 | End: 2019-02-26

## 2019-02-26 RX ADMIN — GADOBUTROL 5 ML: 604.72 INJECTION INTRAVENOUS at 03:02

## 2019-03-01 ENCOUNTER — DOCUMENTATION ONLY (OUTPATIENT)
Dept: ENDOCRINOLOGY | Facility: CLINIC | Age: 39
End: 2019-03-01

## 2019-03-01 DIAGNOSIS — R68.89 ABNORMAL ENDOCRINE LABORATORY TEST FINDING: Primary | ICD-10-CM

## 2019-03-01 NOTE — PROGRESS NOTES
Patients extensive work up has shown the isolated FSH elevation is persistent but no other findings of note other than empty sella appearance on brain MRI. To exclude the rare possibility of an ectopic source of this persistent isolated FSH elevation to obtain screening whole body octreotide scan. If this is -ve will then serially track serum FSH levels 1-2 x a annually going forward.

## 2019-03-21 ENCOUNTER — TELEPHONE (OUTPATIENT)
Dept: ENDOCRINOLOGY | Facility: CLINIC | Age: 39
End: 2019-03-21

## 2019-03-21 NOTE — TELEPHONE ENCOUNTER
Spoke to patient at the clinic today and he was requesting a written order for Vitamin D prescription. Informed the patient that Dr. Rodriguez was out of the clinic. Patient will return to clinic next week to get written Rx and patient would also like a prescription for a good multivitamin, or a suggestion of a good over the counter multivitamin. Patient will also need a copy of the order for the Octreotide scan to bring to his insurance so it can be covered. Informed patient I will give this to him when he comes to the clinic in a week. Patient verbalized understanding.

## 2019-03-21 NOTE — TELEPHONE ENCOUNTER
----- Message from Ross Rodriguez MD sent at 3/6/2019 10:20 AM CST -----  Regarding: Octreotide scan  Kindly assist this patient in arranging to get the ordered octreotide scan.  Thanks    Ross Rodriguez MD

## 2019-04-01 ENCOUNTER — TELEPHONE (OUTPATIENT)
Dept: ENDOCRINOLOGY | Facility: CLINIC | Age: 39
End: 2019-04-01

## 2019-04-01 NOTE — TELEPHONE ENCOUNTER
Patient came into the clinic and was informed of the instructions from Dr. Rodriguez and given order for Octreoscan. Patient will call back once he hears from his insurance to schedule scan.

## 2019-07-02 ENCOUNTER — HOSPITAL ENCOUNTER (OUTPATIENT)
Dept: RADIOLOGY | Facility: HOSPITAL | Age: 39
Discharge: HOME | End: 2019-07-02
Attending: INTERNAL MEDICINE
Payer: OTHER GOVERNMENT

## 2019-07-02 DIAGNOSIS — R68.89 ABNORMAL ENDOCRINE LABORATORY TEST FINDING: ICD-10-CM

## 2019-07-02 PROCEDURE — 78803 RP LOCLZJ TUM SPECT 1 AREA: CPT | Mod: TC

## 2019-07-02 PROCEDURE — 78804 RP LOCLZJ TUM WHBDY 2+D IMG: CPT | Mod: 26,,, | Performed by: RADIOLOGY

## 2019-07-02 PROCEDURE — 78803 NM TUMOR LOC MULTI SPECT OCTREOSCAN: ICD-10-PCS | Mod: 26,,, | Performed by: RADIOLOGY

## 2019-07-02 PROCEDURE — 78999 NM TUMOR LOC MULTI SPECT OCTREOSCAN: ICD-10-PCS | Mod: 26,,, | Performed by: RADIOLOGY

## 2019-07-02 PROCEDURE — 78999 UNLISTED MISC PX DX NUC MED: CPT | Mod: 26,,, | Performed by: RADIOLOGY

## 2019-07-02 PROCEDURE — 78803 RP LOCLZJ TUM SPECT 1 AREA: CPT | Mod: 26,,, | Performed by: RADIOLOGY

## 2019-07-02 PROCEDURE — 78804 NM TUMOR LOC MULTI SPECT OCTREOSCAN: ICD-10-PCS | Mod: 26,,, | Performed by: RADIOLOGY

## 2019-07-03 ENCOUNTER — HOSPITAL ENCOUNTER (OUTPATIENT)
Dept: RADIOLOGY | Facility: HOSPITAL | Age: 39
Discharge: HOME | End: 2019-07-03
Attending: INTERNAL MEDICINE
Payer: OTHER GOVERNMENT

## 2019-07-04 DIAGNOSIS — R68.89 ABNORMAL ENDOCRINE LABORATORY TEST FINDING: Primary | ICD-10-CM

## 2019-07-04 DIAGNOSIS — R79.89 HIGH SERUM FOLLICLE STIMULATING HORMONE (FSH): ICD-10-CM

## 2019-07-25 ENCOUNTER — LAB VISIT (OUTPATIENT)
Dept: LAB | Facility: HOSPITAL | Age: 39
End: 2019-07-25
Attending: INTERNAL MEDICINE
Payer: OTHER GOVERNMENT

## 2019-07-25 DIAGNOSIS — R68.89 ABNORMAL ENDOCRINE LABORATORY TEST FINDING: ICD-10-CM

## 2019-07-25 DIAGNOSIS — R79.89 HIGH SERUM FOLLICLE STIMULATING HORMONE (FSH): ICD-10-CM

## 2019-07-25 LAB
ALBUMIN SERPL BCP-MCNC: 4.3 G/DL (ref 3.5–5.2)
ALP SERPL-CCNC: 56 U/L (ref 55–135)
ALT SERPL W/O P-5'-P-CCNC: 26 U/L (ref 10–44)
ANION GAP SERPL CALC-SCNC: 10 MMOL/L (ref 8–16)
AST SERPL-CCNC: 28 U/L (ref 10–40)
BASOPHILS # BLD AUTO: 0.05 K/UL (ref 0–0.2)
BASOPHILS NFR BLD: 0.8 % (ref 0–1.9)
BILIRUB SERPL-MCNC: 0.7 MG/DL (ref 0.1–1)
BUN SERPL-MCNC: 19 MG/DL (ref 6–20)
CALCIUM SERPL-MCNC: 9.9 MG/DL (ref 8.7–10.5)
CHLORIDE SERPL-SCNC: 101 MMOL/L (ref 95–110)
CO2 SERPL-SCNC: 23 MMOL/L (ref 23–29)
CREAT SERPL-MCNC: 1.1 MG/DL (ref 0.5–1.4)
DIFFERENTIAL METHOD: ABNORMAL
EOSINOPHIL # BLD AUTO: 0.1 K/UL (ref 0–0.5)
EOSINOPHIL NFR BLD: 1 % (ref 0–8)
ERYTHROCYTE [DISTWIDTH] IN BLOOD BY AUTOMATED COUNT: 12.4 % (ref 11.5–14.5)
EST. GFR  (AFRICAN AMERICAN): >60 ML/MIN/1.73 M^2
EST. GFR  (NON AFRICAN AMERICAN): >60 ML/MIN/1.73 M^2
GLUCOSE SERPL-MCNC: 76 MG/DL (ref 70–110)
HCT VFR BLD AUTO: 41.4 % (ref 40–54)
HGB BLD-MCNC: 14.3 G/DL (ref 14–18)
IMM GRANULOCYTES # BLD AUTO: 0.01 K/UL (ref 0–0.04)
IMM GRANULOCYTES NFR BLD AUTO: 0.2 % (ref 0–0.5)
LYMPHOCYTES # BLD AUTO: 2.5 K/UL (ref 1–4.8)
LYMPHOCYTES NFR BLD: 42.1 % (ref 18–48)
MCH RBC QN AUTO: 31.1 PG (ref 27–31)
MCHC RBC AUTO-ENTMCNC: 34.5 G/DL (ref 32–36)
MCV RBC AUTO: 90 FL (ref 82–98)
MONOCYTES # BLD AUTO: 0.4 K/UL (ref 0.3–1)
MONOCYTES NFR BLD: 5.9 % (ref 4–15)
NEUTROPHILS # BLD AUTO: 3 K/UL (ref 1.8–7.7)
NEUTROPHILS NFR BLD: 50 % (ref 38–73)
NRBC BLD-RTO: 0 /100 WBC
PLATELET # BLD AUTO: 214 K/UL (ref 150–350)
PMV BLD AUTO: 11 FL (ref 9.2–12.9)
POTASSIUM SERPL-SCNC: 3.4 MMOL/L (ref 3.5–5.1)
PROT SERPL-MCNC: 7.9 G/DL (ref 6–8.4)
RBC # BLD AUTO: 4.6 M/UL (ref 4.6–6.2)
SODIUM SERPL-SCNC: 134 MMOL/L (ref 136–145)
WBC # BLD AUTO: 5.98 K/UL (ref 3.9–12.7)

## 2019-07-25 PROCEDURE — 83002 ASSAY OF GONADOTROPIN (LH): CPT

## 2019-07-25 PROCEDURE — 82670 ASSAY OF TOTAL ESTRADIOL: CPT

## 2019-07-25 PROCEDURE — 80053 COMPREHEN METABOLIC PANEL: CPT

## 2019-07-25 PROCEDURE — 82642 DIHYDROTESTOSTERONE: CPT

## 2019-07-25 PROCEDURE — 82672 ASSAY OF ESTROGEN: CPT

## 2019-07-25 PROCEDURE — 84146 ASSAY OF PROLACTIN: CPT

## 2019-07-25 PROCEDURE — 82040 ASSAY OF SERUM ALBUMIN: CPT

## 2019-07-25 PROCEDURE — 85025 COMPLETE CBC W/AUTO DIFF WBC: CPT

## 2019-07-25 PROCEDURE — 86316 IMMUNOASSAY TUMOR OTHER: CPT

## 2019-07-25 PROCEDURE — 83001 ASSAY OF GONADOTROPIN (FSH): CPT

## 2019-07-26 DIAGNOSIS — E23.6 EMPTY SELLA SYNDROME: ICD-10-CM

## 2019-07-26 DIAGNOSIS — E87.1 HYPONATREMIA: Primary | ICD-10-CM

## 2019-07-26 DIAGNOSIS — R68.89 ABNORMAL ENDOCRINE LABORATORY TEST FINDING: ICD-10-CM

## 2019-07-26 DIAGNOSIS — E87.6 HYPOKALEMIA: ICD-10-CM

## 2019-07-26 LAB
ESTRADIOL SERPL-MCNC: 17 PG/ML (ref 11–44)
FSH SERPL-ACNC: 18.6 MIU/ML (ref 0.95–11.95)
LH SERPL-ACNC: 6.8 MIU/ML (ref 0.6–12.1)
PROLACTIN SERPL IA-MCNC: 14.9 NG/ML (ref 3.5–19.4)

## 2019-07-29 LAB — ESTROGEN SERPL-MCNC: 154 PG/ML

## 2019-07-31 LAB
ALBUMIN SERPL-MCNC: 4.7 G/DL (ref 3.6–5.1)
CGA SERPL-MCNC: 86 NG/ML (ref 0–95)
SHBG SERPL-SCNC: 29 NMOL/L (ref 10–50)
TESTOST FREE SERPL-MCNC: 75.2 PG/ML (ref 46–224)
TESTOST SERPL-MCNC: 507 NG/DL (ref 250–1100)
TESTOSTERONE.FREE+WB SERPL-MCNC: 161.3 NG/DL (ref 110–575)

## 2019-08-01 LAB — ANDROSTANOLONE SERPL-MCNC: 256 PG/ML (ref 112–955)

## 2019-08-02 ENCOUNTER — LAB VISIT (OUTPATIENT)
Dept: LAB | Facility: HOSPITAL | Age: 39
End: 2019-08-02
Attending: INTERNAL MEDICINE
Payer: OTHER GOVERNMENT

## 2019-08-02 ENCOUNTER — OFFICE VISIT (OUTPATIENT)
Dept: ENDOCRINOLOGY | Facility: CLINIC | Age: 39
End: 2019-08-02
Payer: OTHER GOVERNMENT

## 2019-08-02 VITALS
HEART RATE: 89 BPM | TEMPERATURE: 99 F | DIASTOLIC BLOOD PRESSURE: 86 MMHG | WEIGHT: 221.56 LBS | HEIGHT: 68 IN | SYSTOLIC BLOOD PRESSURE: 134 MMHG | BODY MASS INDEX: 33.58 KG/M2

## 2019-08-02 DIAGNOSIS — E23.6 EMPTY SELLA SYNDROME: ICD-10-CM

## 2019-08-02 DIAGNOSIS — R68.89 ABNORMAL ENDOCRINE LABORATORY TEST FINDING: Primary | ICD-10-CM

## 2019-08-02 DIAGNOSIS — E88.810 DYSMETABOLIC SYNDROME: ICD-10-CM

## 2019-08-02 DIAGNOSIS — K21.9 GASTROESOPHAGEAL REFLUX DISEASE WITHOUT ESOPHAGITIS: ICD-10-CM

## 2019-08-02 DIAGNOSIS — E78.5 HYPERLIPIDEMIA, UNSPECIFIED HYPERLIPIDEMIA TYPE: ICD-10-CM

## 2019-08-02 DIAGNOSIS — N52.01 ERECTILE DYSFUNCTION DUE TO ARTERIAL INSUFFICIENCY: ICD-10-CM

## 2019-08-02 DIAGNOSIS — E88.819 INSULIN RESISTANCE: ICD-10-CM

## 2019-08-02 DIAGNOSIS — E87.1 HYPONATREMIA: ICD-10-CM

## 2019-08-02 DIAGNOSIS — I10 ESSENTIAL HYPERTENSION: ICD-10-CM

## 2019-08-02 DIAGNOSIS — E16.1 HYPERINSULINEMIA: ICD-10-CM

## 2019-08-02 DIAGNOSIS — E66.9 OBESITY (BMI 30-39.9): ICD-10-CM

## 2019-08-02 DIAGNOSIS — E55.9 HYPOVITAMINOSIS D: ICD-10-CM

## 2019-08-02 DIAGNOSIS — E87.6 HYPOKALEMIA: ICD-10-CM

## 2019-08-02 DIAGNOSIS — R68.89 ABNORMAL ENDOCRINE LABORATORY TEST FINDING: ICD-10-CM

## 2019-08-02 DIAGNOSIS — E78.1 HYPERTRIGLYCERIDEMIA: ICD-10-CM

## 2019-08-02 LAB
ANION GAP SERPL CALC-SCNC: 10 MMOL/L (ref 8–16)
BNP SERPL-MCNC: <10 PG/ML (ref 0–99)
BUN SERPL-MCNC: 17 MG/DL (ref 6–20)
CALCIUM SERPL-MCNC: 9.8 MG/DL (ref 8.7–10.5)
CHLORIDE SERPL-SCNC: 107 MMOL/L (ref 95–110)
CO2 SERPL-SCNC: 22 MMOL/L (ref 23–29)
CREAT SERPL-MCNC: 1 MG/DL (ref 0.5–1.4)
EST. GFR  (AFRICAN AMERICAN): >60 ML/MIN/1.73 M^2
EST. GFR  (NON AFRICAN AMERICAN): >60 ML/MIN/1.73 M^2
GLUCOSE SERPL-MCNC: 102 MG/DL (ref 70–110)
POTASSIUM SERPL-SCNC: 3.9 MMOL/L (ref 3.5–5.1)
SODIUM SERPL-SCNC: 139 MMOL/L (ref 136–145)

## 2019-08-02 PROCEDURE — 99999 PR PBB SHADOW E&M-EST. PATIENT-LVL III: CPT | Mod: PBBFAC,,, | Performed by: INTERNAL MEDICINE

## 2019-08-02 PROCEDURE — 83520 IMMUNOASSAY QUANT NOS NONAB: CPT | Mod: 59

## 2019-08-02 PROCEDURE — 99213 OFFICE O/P EST LOW 20 MIN: CPT | Mod: PBBFAC,PO | Performed by: INTERNAL MEDICINE

## 2019-08-02 PROCEDURE — 99214 OFFICE O/P EST MOD 30 MIN: CPT | Mod: S$PBB,,, | Performed by: INTERNAL MEDICINE

## 2019-08-02 PROCEDURE — 83930 ASSAY OF BLOOD OSMOLALITY: CPT

## 2019-08-02 PROCEDURE — 99214 PR OFFICE/OUTPT VISIT, EST, LEVL IV, 30-39 MIN: ICD-10-PCS | Mod: S$PBB,,, | Performed by: INTERNAL MEDICINE

## 2019-08-02 PROCEDURE — 83880 ASSAY OF NATRIURETIC PEPTIDE: CPT

## 2019-08-02 PROCEDURE — 86336 INHIBIN A: CPT

## 2019-08-02 PROCEDURE — 99999 PR PBB SHADOW E&M-EST. PATIENT-LVL III: ICD-10-PCS | Mod: PBBFAC,,, | Performed by: INTERNAL MEDICINE

## 2019-08-02 PROCEDURE — 80048 BASIC METABOLIC PNL TOTAL CA: CPT

## 2019-08-02 PROCEDURE — 36415 COLL VENOUS BLD VENIPUNCTURE: CPT | Mod: PO

## 2019-08-02 PROCEDURE — 83520 IMMUNOASSAY QUANT NOS NONAB: CPT

## 2019-08-02 PROCEDURE — 84588 ASSAY OF VASOPRESSIN: CPT

## 2019-08-02 NOTE — PROGRESS NOTES
Subjective:      Patient ID: Tay Petty is a 39 y.o. male.    Chief Complaint:      Patient is a 39 yr old gentleman seen in [facility]  today on account of isolated otherwise asymptomatic FSH elevation.    History of Present Illness    Patient is a 39 yr old gentleman with background history of essential hypertensin, hyper TG, dyslipidemia, obesity and dysmetabolic syndrome seen in [facility]  today on account of abnormal endocrine lab test results;  His associated background comorbidities are as follows;      Patient Active Problem List   Diagnosis    Essential hypertension    Dysmetabolic syndrome    Obesity (BMI 30-39.9)    Hypertriglyceridemia    Hyperlipidemia    Gastroesophageal reflux disease without esophagitis    Erectile dysfunction    Abnormal endocrine laboratory test finding      Patients PCP is Dr Singh @ [facility] @ [place].  Patients baseline [place] score is 2.   Patients baseline scrotal ultrasound showed nil except left small cyst (epididymal).  Patients ED started ~ 02/18. Patient has been under some job related pressure.  He has had no significant head injuries.  He has also not had any groin injuries nor surgery.      There is also no family history of ED not hypogonadism. Patients libido is fine.  He has no persistent headaches, he has no changes in his sense of smell, no change in sense of taste and he has not noted any nipple discharge nor breast tissue enlargement.  Patient does not smoke.  Patients has ~ 12 beers a month; mainly on fridays.  Patient works in the [employer] as a .  Patient presently has no children and may be having children in the future.    Patients work up showed essentially normal scrotal USS and MRI of pituitary as well.       Patients extensive work up has shown the isolated FSH elevation is persistent but no other findings of note other than empty sella appearance on brain MRI. To exclude the rare possibility of an ectopic source of this persistent  "isolated FSH elevation we obtained screening whole body octreotide scan which was unremarkable.    Review of Systems   Constitutional: Negative for activity change, appetite change, diaphoresis, fatigue, fever and unexpected weight change.   HENT: Negative for hearing loss, mouth sores, trouble swallowing and voice change.    Eyes: Negative for photophobia and visual disturbance.   Respiratory: Negative for apnea, cough, chest tightness, shortness of breath, wheezing and stridor.    Cardiovascular: Negative for chest pain, palpitations and leg swelling.   Gastrointestinal: Negative for abdominal distention, abdominal pain, constipation, diarrhea and nausea.   Endocrine: Negative for cold intolerance, heat intolerance, polydipsia, polyphagia and polyuria.   Genitourinary: Negative for dysuria, flank pain, frequency, hematuria and urgency. Penile pain: has reduced sustainability of erections but libido remains essentially normal.   Musculoskeletal: Negative for arthralgias, back pain, gait problem, joint swelling, myalgias and neck pain.   Skin: Negative for color change, pallor and rash.   Neurological: Negative for dizziness, tremors, seizures, syncope, light-headedness, numbness and headaches.   Hematological: Does not bruise/bleed easily.   Psychiatric/Behavioral: Negative for agitation, confusion, dysphoric mood and sleep disturbance. The patient is not nervous/anxious and is not hyperactive.        Objective: /86 (BP Location: Right arm, Patient Position: Sitting, BP Method: Medium (Manual))   Pulse 89   Temp 98.8 °F (37.1 °C) (Oral)   Ht 5' 8" (1.727 m)   Wt 100.5 kg (221 lb 9 oz)   BMI 33.69 kg/m²  Body surface area is 2.2 meters squared.         Physical Exam   Constitutional: He appears well-developed and well-nourished. No distress.   Pleasant middle aged gentleman. Not pale, anicteric and afebrile. Well hydrated. Not in any acute distress.   HENT:   Head: Normocephalic and atraumatic.   Eyes: " Pupils are equal, round, and reactive to light. Conjunctivae and EOM are normal. No scleral icterus.   Neck: No JVD present. No tracheal deviation present. No thyromegaly present.   Cardiovascular: Normal rate, regular rhythm and normal heart sounds.   Pulmonary/Chest: Effort normal and breath sounds normal. No stridor. No respiratory distress. He has no wheezes.   Abdominal: He exhibits no distension. There is no tenderness.   Musculoskeletal: Normal range of motion. He exhibits no edema.   Lymphadenopathy:     He has no cervical adenopathy.   Neurological: He is alert. No cranial nerve deficit.   Skin: Skin is warm and dry. No rash noted. He is not diaphoretic. No erythema. No pallor.   Psychiatric: He has a normal mood and affect. His behavior is normal. Judgment and thought content normal.   Vitals reviewed.      Lab Review:     Results for TRISHA DEL RIO (MRN [id number]) as of 8/2/2019 14:20   Ref. Range 2/11/2019 10:31 2/26/2019 15:55 7/3/2019 15:25 7/25/2019 14:44   WBC Latest Ref Range: 3.90 - 12.70 K/uL 4.89   5.98   RBC Latest Ref Range: 4.60 - 6.20 M/uL 4.78   4.60   Hemoglobin Latest Ref Range: 14.0 - 18.0 g/dL 14.9   14.3   Hematocrit Latest Ref Range: 40.0 - 54.0 % 42.1   41.4   MCV Latest Ref Range: 82 - 98 fL 88   90   MCH Latest Ref Range: 27.0 - 31.0 pg 31.2 (H)   31.1 (H)   MCHC Latest Ref Range: 32.0 - 36.0 g/dL 35.4   34.5   RDW Latest Ref Range: 11.5 - 14.5 % 12.3   12.4   Platelets Latest Ref Range: 150 - 350 K/uL 219   214   MPV Latest Ref Range: 9.2 - 12.9 fL 11.3   11.0   Gran% Latest Ref Range: 38.0 - 73.0 % 53.0   50.0   Gran # (ANC) Latest Ref Range: 1.8 - 7.7 K/uL 2.6   3.0   Lymph% Latest Ref Range: 18.0 - 48.0 % 39.9   42.1   Lymph # Latest Ref Range: 1.0 - 4.8 K/uL 2.0   2.5   Mono% Latest Ref Range: 4.0 - 15.0 % 4.1   5.9   Mono # Latest Ref Range: 0.3 - 1.0 K/uL 0.2 (L)   0.4   Eosinophil% Latest Ref Range: 0.0 - 8.0 % 2.0   1.0   Eos # Latest Ref Range: 0.0 - 0.5 K/uL  0.1   0.1   Basophil% Latest Ref Range: 0.0 - 1.9 % 0.8   0.8   Baso # Latest Ref Range: 0.00 - 0.20 K/uL 0.04   0.05   nRBC Latest Ref Range: 0 /100 WBC 0   0   Differential Method Unknown Automated   Automated   Immature Grans (Abs) Latest Ref Range: 0.00 - 0.04 K/uL 0.01   0.01   Immature Granulocytes Latest Ref Range: 0.0 - 0.5 % 0.2   0.2   Sodium Latest Ref Range: 136 - 145 mmol/L 137   134 (L)   Potassium Latest Ref Range: 3.5 - 5.1 mmol/L 3.5   3.4 (L)   Chloride Latest Ref Range: 95 - 110 mmol/L 103   101   CO2 Latest Ref Range: 23 - 29 mmol/L 24   23   Anion Gap Latest Ref Range: 8 - 16 mmol/L 10   10   BUN, Bld Latest Ref Range: 6 - 20 mg/dL 13   19   Creatinine Latest Ref Range: 0.5 - 1.4 mg/dL 1.0   1.1   eGFR if non African American Latest Ref Range: >60 mL/min/1.73 m^2 >60.0   >60.0   eGFR if African American Latest Ref Range: >60 mL/min/1.73 m^2 >60.0   >60.0   Glucose Latest Ref Range: 70 - 110 mg/dL 142 (H)   76   Calcium Latest Ref Range: 8.7 - 10.5 mg/dL 9.8   9.9   Calcium, Ion Latest Ref Range: 1.06 - 1.42 mmol/L 1.28      Alkaline Phosphatase Latest Ref Range: 55 - 135 U/L 67   56   PROTEIN TOTAL Latest Ref Range: 6.0 - 8.4 g/dL 7.9   7.9   Albumin Latest Ref Range: 3.5 - 5.2 g/dL 4.3   4.3   Uric Acid Latest Ref Range: 3.4 - 7.0 mg/dL 5.0      BILIRUBIN TOTAL Latest Ref Range: 0.1 - 1.0 mg/dL 0.7   0.7   AST Latest Ref Range: 10 - 40 U/L 51 (H)   28   ALT Latest Ref Range: 10 - 44 U/L 36   26   Triglycerides Latest Ref Range: 30 - 150 mg/dL 425 (H)      Cholesterol Latest Ref Range: 120 - 199 mg/dL 195      HDL Latest Ref Range: 40 - 75 mg/dL 41      Hdl/Cholesterol Ratio Latest Ref Range: 20.0 - 50.0 % 21.0      LDL Cholesterol External Latest Ref Range: 63.0 - 159.0 mg/dL Invalid, Trig>400.0      Non-HDL Cholesterol Latest Units: mg/dL 154      Total Cholesterol/HDL Ratio Latest Ref Range: 2.0 - 5.0  4.8      Vit D, 25-Hydroxy Latest Ref Range: 30 - 96 ng/mL 14 (L)      ALDOSTERONE Latest  Units: ng/dL 9.4      Cortisol Latest Units: ug/dL 8.00      Hemoglobin A1C External Latest Ref Range: 4.0 - 5.6 % 5.1      Estimated Avg Glucose Latest Ref Range: 68 - 131 mg/dL 100      Insulin Latest Ref Range: <25.0 uU/mL 109.6 (H)      Insulin Collection Interval Unknown random      ACTH Latest Ref Range: 0 - 46 pg/mL 24      TSH Latest Ref Range: 0.400 - 4.000 uIU/mL 1.333      T3, Total Latest Ref Range: 60 - 180 ng/dL 94      Free T4 Latest Ref Range: 0.71 - 1.51 ng/dL 0.80      PTH Latest Ref Range: 9.0 - 77.0 pg/mL 26.0      Alpha Sub Unit, Serum Latest Ref Range: <=0.5 ng/mL 0.2      Chromogranin A Latest Ref Range: 0 - 95 ng/mL 54   86   Albumin Latest Ref Range: 3.6 - 5.1 g/dL 4.6   4.7   DHEA Latest Ref Range: 0.630 - 4.700 ng/mL 1.970      DHEA-SO4 Latest Ref Range: 139.7 - 484.4 ug/dL 232.1      Dihydrotestosterone Latest Ref Range: 112 - 955 pg/mL 270   256   Estradiol Latest Ref Range: 11 - 44 pg/mL 21   17   Estrogen Latest Units: pg/mL 151   154   FSH Latest Ref Range: 0.95 - 11.95 mIU/mL 16.40 (H)   18.60 (H)   LH Latest Ref Range: 0.6 - 12.1 mIU/mL 5.8   6.8   Prolactin Latest Ref Range: 3.5 - 19.4 ng/mL    14.9   Sex Hormone Binding Globulin Latest Ref Range: 10 - 50 nmol/L 30   29   Testosterone Latest Ref Range: 250 - 1100 ng/dL 398   507   Testosterone, Bioavailable Latest Ref Range: 110.0 - 575.0 ng/dL 117.2   161.3   Testosterone, Free Latest Ref Range: 46.0 - 224.0 pg/mL 55.8   75.2       Assessment:     1. Abnormal endocrine laboratory test finding  Inhibin    Inhibin B    Antimullerian hormone (AMH)    Semen analysis   2. Dysmetabolic syndrome     3. Gastroesophageal reflux disease without esophagitis     4. Hyperlipidemia, unspecified hyperlipidemia type     5. Hypertriglyceridemia     6. Essential hypertension     7. Erectile dysfunction due to arterial insufficiency  Inhibin    Inhibin B    Antimullerian hormone (AMH)    Semen analysis   8. Obesity (BMI 30-39.9)     9. Insulin  resistance     10. Hypovitaminosis D  Vitamin D   11. Hyperinsulinemia     12. Empty sella syndrome          Regarding isolated increased FSH.; will obtain semen analysis to be sure he does not have oligospermia or asospermia as the basis for his isolated FSH elevation.  Regarding dysmetabolic syndrome; to obtain screening labs as detailed above to evaluate current glycemic status. Patient has no known family history of diabetes.  Regarding obesity; encourage portion size control and reduction in total caloric intake. May benefit from formal dietary counselling.  Regarding hyper TG; to continue fenofibrate as before.   Regarding hypertension; presently well controlled. To continue present antihypertensive regimen and serial ambulatory tracking of BP trends.    Plan:     FFup in ~ 4mths

## 2019-08-03 LAB — OSMOLALITY SERPL: 297 MOSM/KG (ref 280–300)

## 2019-08-09 LAB
INHIBIN A SERPL-MCNC: <1 PG/ML (ref 0–1.9)
INHIBIN B SERPL IA-MCNC: 41 PG/ML
MIS SERPL-MCNC: 2.8 NG/ML (ref 0.7–19)

## 2019-08-11 LAB — VASOPRESSIN SERPL-MCNC: 2.4 PG/ML (ref 0–6.9)

## 2019-08-27 ENCOUNTER — LAB VISIT (OUTPATIENT)
Dept: LAB | Facility: HOSPITAL | Age: 39
End: 2019-08-27
Attending: INTERNAL MEDICINE
Payer: OTHER GOVERNMENT

## 2019-08-27 DIAGNOSIS — N52.01 ERECTILE DYSFUNCTION DUE TO ARTERIAL INSUFFICIENCY: ICD-10-CM

## 2019-08-27 DIAGNOSIS — R68.89 ABNORMAL ENDOCRINE LABORATORY TEST FINDING: ICD-10-CM

## 2019-08-27 PROBLEM — N46.11 OLIGOSPERMIA: Status: ACTIVE | Noted: 2019-08-27

## 2019-08-27 PROCEDURE — 89320 SEMEN ANAL VOL/COUNT/MOT: CPT

## 2019-08-30 LAB
GERM CELLS, SEMEN: ABNORMAL
PH SMN: 8.5 [PH]
PMN'S/100 SPERMATAZOA: 0 %
SPECIMEN VOL SMN: 1.4 ML
SPERM # SMN: ABNORMAL M
SPERM # SMN: ABNORMAL M/ML
SPERM MOTILE NFR SMN: 66 %
SPERM NORM MOTILE # SMN: ABNORMAL M (ref 50–?)
SPERM NORM NFR SMN: 25 %
SPERM PROG NFR SMN: 55 %
SPERM SMN: ABNORMAL
VISC SMN QL: NORMAL
WBC # SMN: 0 M/ML (ref 0–1)
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
